# Patient Record
Sex: FEMALE | Race: WHITE | Employment: FULL TIME | URBAN - METROPOLITAN AREA
[De-identification: names, ages, dates, MRNs, and addresses within clinical notes are randomized per-mention and may not be internally consistent; named-entity substitution may affect disease eponyms.]

---

## 2017-04-04 DIAGNOSIS — F41.1 GAD (GENERALIZED ANXIETY DISORDER): ICD-10-CM

## 2017-04-04 NOTE — TELEPHONE ENCOUNTER
sertraline (ZOLOFT) 100 MG tablet     Last Written Prescription Date: 12/14/16  Last Fill Quantity: 180, # refills: 0  Last Office Visit with [facility] primary care provider: 11/16/2015       Last PHQ-9 score on record=   PHQ-9 SCORE 11/16/2015   Total Score 0         MJ Austin  April 4, 2017  10:55 AM

## 2017-04-12 RX ORDER — SERTRALINE HYDROCHLORIDE 100 MG/1
TABLET, FILM COATED ORAL
Qty: 180 TABLET | Refills: 0
Start: 2017-04-12

## 2017-04-14 ENCOUNTER — OFFICE VISIT (OUTPATIENT)
Dept: FAMILY MEDICINE | Facility: CLINIC | Age: 52
End: 2017-04-14

## 2017-04-14 VITALS
BODY MASS INDEX: 41.95 KG/M2 | RESPIRATION RATE: 20 BRPM | SYSTOLIC BLOOD PRESSURE: 120 MMHG | TEMPERATURE: 98.7 F | DIASTOLIC BLOOD PRESSURE: 78 MMHG | WEIGHT: 261 LBS | OXYGEN SATURATION: 97 % | HEIGHT: 66 IN | HEART RATE: 79 BPM

## 2017-04-14 DIAGNOSIS — F41.1 GAD (GENERALIZED ANXIETY DISORDER): ICD-10-CM

## 2017-04-14 PROCEDURE — 99213 OFFICE O/P EST LOW 20 MIN: CPT | Performed by: PHYSICIAN ASSISTANT

## 2017-04-14 RX ORDER — SERTRALINE HYDROCHLORIDE 100 MG/1
200 TABLET, FILM COATED ORAL AT BEDTIME
Qty: 180 TABLET | Refills: 1 | Status: SHIPPED | OUTPATIENT
Start: 2017-04-14 | End: 2017-10-20

## 2017-04-14 ASSESSMENT — ANXIETY QUESTIONNAIRES
3. WORRYING TOO MUCH ABOUT DIFFERENT THINGS: SEVERAL DAYS
7. FEELING AFRAID AS IF SOMETHING AWFUL MIGHT HAPPEN: NOT AT ALL
1. FEELING NERVOUS, ANXIOUS, OR ON EDGE: SEVERAL DAYS
GAD7 TOTAL SCORE: 7
6. BECOMING EASILY ANNOYED OR IRRITABLE: NEARLY EVERY DAY
IF YOU CHECKED OFF ANY PROBLEMS ON THIS QUESTIONNAIRE, HOW DIFFICULT HAVE THESE PROBLEMS MADE IT FOR YOU TO DO YOUR WORK, TAKE CARE OF THINGS AT HOME, OR GET ALONG WITH OTHER PEOPLE: SOMEWHAT DIFFICULT
2. NOT BEING ABLE TO STOP OR CONTROL WORRYING: SEVERAL DAYS
5. BEING SO RESTLESS THAT IT IS HARD TO SIT STILL: NOT AT ALL

## 2017-04-14 ASSESSMENT — PATIENT HEALTH QUESTIONNAIRE - PHQ9: 5. POOR APPETITE OR OVEREATING: SEVERAL DAYS

## 2017-04-14 NOTE — PROGRESS NOTES
SUBJECTIVE:                                                    Ladi Chirinos is a 51 year old female who presents to clinic today for the following health issues:  Anxiety Follow-Up    Status since last visit: No change    Other associated symptoms: stress, tightness in chest    Complicating factors:   Significant life event: No   Current substance abuse: None  Depression symptoms: Yes-  Financial stress  JOSEMANUEL-7 SCORE 11/16/2015   Total Score 3        Ladi is here today for an anxiety follow up. Having some issues with insurance, just wants a med check today. Will have insurance starting May 1st and will come in for a physical following this. 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 Is currently on 200 mg of Zoloft at nighttime. Unsure if any other medications would help as it seems situational. Unsure if she has time for therapy. Does think she has some depression aspects, but thinks this could related to stress.     GAD7: 7       Amount of exercise or physical activity: None    Problems taking medications regularly: No    Medication side effects: none    Diet: regular (no restrictions)    Problem list and histories reviewed & adjusted, as indicated.  Additional history: as documented    BP Readings from Last 3 Encounters:   04/14/17 120/78   11/16/15 92/66   02/20/14 125/78    Wt Readings from Last 3 Encounters:   04/14/17 118.4 kg (261 lb)   11/16/15 103.7 kg (228 lb 11.2 oz)   02/20/14 108.9 kg (240 lb)            Labs reviewed in EPIC    Reviewed and updated as needed this visit by clinical staff  Tobacco  Allergies  Meds  Problems  Med Hx  Surg Hx  Fam Hx  Soc Hx        Reviewed and updated as needed this visit by Provider         ROS:  CONSTITUTIONAL:NEGATIVE for fever, chills, change in weight  INTEGUMENTARY/SKIN: NEGATIVE for worrisome rashes, moles or lesions  MUSCULOSKELETAL: NEGATIVE for significant arthralgias or  "myalgia  PSYCHIATRIC: POSITIVE for anxiety    This document serves as a record of the services and decisions personally performed and made by Emerson Etienne PA-C. It was created on her behalf by Marta Landaverde, a trained medical scribe. The creation of this document is based the provider's statements to the medical scribe.  Marta Landaverde April 14, 2017 3:51 PM    OBJECTIVE:                                                    /78 (BP Location: Right arm, Patient Position: Chair, Cuff Size: Adult Large)  Pulse 79  Temp 98.7  F (37.1  C) (Oral)  Resp 20  Ht 1.676 m (5' 6\")  Wt 118.4 kg (261 lb)  SpO2 97%  BMI 42.13 kg/m2  Body mass index is 42.13 kg/(m^2).  GENERAL: healthy, alert and no distress  MS: no gross musculoskeletal defects noted, no edema  SKIN: no suspicious lesions or rashes  NEURO: Normal strength and tone, mentation intact and speech normal  PSYCH: mentation appears normal, affect normal/bright    Diagnostic Test Results:  none      ASSESSMENT/PLAN:                                                    1. JOSEMANUEL (generalized anxiety disorder)  Zoloft refilled. 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 Discussed we are at top dose of Zoloft. Discussed options of add on of Buspar for anxiety, or Wellbutrin if some depression symptoms. Patient will consider and follow up at physical in May.   - sertraline (ZOLOFT) 100 MG tablet; Take 2 tablets (200 mg) by mouth At Bedtime  Dispense: 180 tablet; Refill: 1      The information in this document, created by the medical scribe for me, accurately reflects the services I personally performed and the decisions made by me. I have reviewed and approved this document for accuracy prior to leaving the patient care area.  3:51 PM 4/14/2017          Emerson Etienne PA-C  [facility]    "

## 2017-04-14 NOTE — MR AVS SNAPSHOT
"              After Visit Summary   4/14/2017    Ladi Chirinos    MRN: [id number]           Patient Information     Date Of Birth          1965        Visit Information        Provider Department      4/14/2017 3:20 PM Emerson Etienne PA-C [facility]        Today's Diagnoses     JOSEMANUEL (generalized anxiety disorder)          Care Instructions    Look into Buspar as an optional add on.         Follow-ups after your visit        Follow-up notes from your care team     Return in about 1 month (around 5/14/2017) for Physical Exam.      Who to contact     If you have questions or need follow up information about today's clinic visit or your schedule please contact [facility] directly at [phone].  Normal or non-critical lab and imaging results will be communicated to you by MyChart, letter or phone within 4 business days after the clinic has received the results. If you do not hear from us within 7 days, please contact the clinic through [employer] or phone. If you have a critical or abnormal lab result, we will notify you by phone as soon as possible.  Submit refill requests through [email or web address] or call your pharmacy and they will forward the refill request to us. Please allow 3 business days for your refill to be completed.          Additional Information About Your Visit        MyChart Information     [email or web address] lets you send messages to your doctor, view your test results, renew your prescriptions, schedule appointments and more. To sign up, go to [email or web address] . Click on \"Log in\" on the left side of the screen, which will take you to the Welcome page. Then click on \"Sign up Now\" on the right side of the page.     You will be asked to enter the access code listed below, as well as some personal information. Please follow the directions to create your username and password.     Your access code is: [email or web address]  Expires: 7/13/2017  4:03 PM     Your access code will " " in 90 days. If you need help or a new code, please call your [place] clinic or [phone].        Care EveryWhere ID     This is your Care EveryWhere ID. This could be used by other organizations to access your [place] medical records  [id number]        Your Vitals Were     Pulse Temperature Respirations Height Pulse Oximetry BMI (Body Mass Index)    79 98.7  F (37.1  C) (Oral) 20 5' 6\" (1.676 m) 97% 42.13 kg/m2       Blood Pressure from Last 3 Encounters:   17 120/78   11/16/15 92/66   14 125/78    Weight from Last 3 Encounters:   17 261 lb (118.4 kg)   11/16/15 228 lb 11.2 oz (103.7 kg)   14 240 lb (108.9 kg)              Today, you had the following     No orders found for display         Where to get your medicines      These medications were sent to [facility]/pharmacy #[id number] - [place], [place] - [address]  [address]    [facility] [address]     Phone:  [phone]     sertraline 100 MG tablet          Primary Care Provider    None Specified       No primary provider on file.        Thank you!     Thank you for choosing [facility]  for your care. Our goal is always to provide you with excellent care. Hearing back from our patients is one way we can continue to improve our services. Please take a few minutes to complete the written survey that you may receive in the mail after your visit with us. Thank you!             Your Updated Medication List - Protect others around you: Learn how to safely use, store and throw away your medicines at www.disposemymeds.org.          This list is accurate as of: 17  4:03 PM.  Always use your most recent med list.                   Brand Name Dispense Instructions for use    LIPID LOWERING THERAPY NOT PRESCRIBED (INTENTIONAL)     0 each    Lipid lowering therapy not prescribed intentionally due to Other - patient does not want to take now. (This option does not exclude patient from measure)       sertraline 100 " MG tablet    ZOLOFT    180 tablet    Take 2 tablets (200 mg) by mouth At Bedtime

## 2017-04-15 ASSESSMENT — ANXIETY QUESTIONNAIRES: GAD7 TOTAL SCORE: 7

## 2017-04-15 ASSESSMENT — PATIENT HEALTH QUESTIONNAIRE - PHQ9: SUM OF ALL RESPONSES TO PHQ QUESTIONS 1-9: 8

## 2017-10-18 ENCOUNTER — TELEPHONE (OUTPATIENT)
Dept: FAMILY MEDICINE | Facility: CLINIC | Age: 52
End: 2017-10-18

## 2017-10-20 DIAGNOSIS — F41.1 GAD (GENERALIZED ANXIETY DISORDER): ICD-10-CM

## 2017-10-20 RX ORDER — SERTRALINE HYDROCHLORIDE 100 MG/1
TABLET, FILM COATED ORAL
Qty: 60 TABLET | Refills: 0 | Status: SHIPPED | OUTPATIENT
Start: 2017-10-20 | End: 2017-10-31

## 2017-10-20 NOTE — TELEPHONE ENCOUNTER
Last Office Visit with [facility], [facility] or [facility] prescribing provider: 4/14/2017

## 2017-10-20 NOTE — LETTER
[facility]  October 20, 2017      [place] [address]           Phone :  [phone]          Fax:  [phone]  Ladi Chirinos  [address] [place] [address]      Dear Ladi,    We recently received a call from your pharmacy requesting a refill of your medication - ZOLOFT.    A review of your chart indicates that an appointment is required with your provider for medication check up. Please call the clinic at [phone] to schedule your appointment.    We have authorized one refill of your medication to allow time for you to schedule your appointment.    Taking care of your health is important to us, and ongoing visits with your provider are vital to your care.  We look forward to seeing you in the near future.        Sincerely,        Emerson Etienne PA-C / Kia Benites RN

## 2017-10-20 NOTE — TELEPHONE ENCOUNTER
Patient due for follow up.  Tried calling patient.  Unable to leave message.  Memory full.  Will give 1 refill and send letter.  Kia Benites RN

## 2017-10-31 ENCOUNTER — OFFICE VISIT (OUTPATIENT)
Dept: FAMILY MEDICINE | Facility: CLINIC | Age: 52
End: 2017-10-31
Payer: COMMERCIAL

## 2017-10-31 VITALS
WEIGHT: 256 LBS | BODY MASS INDEX: 41.14 KG/M2 | HEART RATE: 76 BPM | DIASTOLIC BLOOD PRESSURE: 72 MMHG | SYSTOLIC BLOOD PRESSURE: 124 MMHG | RESPIRATION RATE: 16 BRPM | HEIGHT: 66 IN | TEMPERATURE: 99.4 F

## 2017-10-31 DIAGNOSIS — F41.1 GAD (GENERALIZED ANXIETY DISORDER): ICD-10-CM

## 2017-10-31 PROCEDURE — 99213 OFFICE O/P EST LOW 20 MIN: CPT | Performed by: PHYSICIAN ASSISTANT

## 2017-10-31 RX ORDER — SERTRALINE HYDROCHLORIDE 100 MG/1
200 TABLET, FILM COATED ORAL DAILY
Qty: 180 TABLET | Refills: 1 | Status: SHIPPED | OUTPATIENT
Start: 2017-10-31 | End: 2018-05-29

## 2017-10-31 RX ORDER — BUSPIRONE HYDROCHLORIDE 5 MG/1
TABLET ORAL
Qty: 150 TABLET | Refills: 0 | Status: SHIPPED | OUTPATIENT
Start: 2017-10-31

## 2017-10-31 ASSESSMENT — PATIENT HEALTH QUESTIONNAIRE - PHQ9
5. POOR APPETITE OR OVEREATING: SEVERAL DAYS
SUM OF ALL RESPONSES TO PHQ QUESTIONS 1-9: 4

## 2017-10-31 ASSESSMENT — ANXIETY QUESTIONNAIRES
2. NOT BEING ABLE TO STOP OR CONTROL WORRYING: NOT AT ALL
GAD7 TOTAL SCORE: 5
3. WORRYING TOO MUCH ABOUT DIFFERENT THINGS: NOT AT ALL
1. FEELING NERVOUS, ANXIOUS, OR ON EDGE: SEVERAL DAYS
5. BEING SO RESTLESS THAT IT IS HARD TO SIT STILL: NOT AT ALL
6. BECOMING EASILY ANNOYED OR IRRITABLE: NEARLY EVERY DAY
7. FEELING AFRAID AS IF SOMETHING AWFUL MIGHT HAPPEN: NOT AT ALL

## 2017-10-31 ASSESSMENT — ENCOUNTER SYMPTOMS: NERVOUS/ANXIOUS: 1

## 2017-10-31 NOTE — MR AVS SNAPSHOT
"              After Visit Summary   10/31/2017    Ladi Chirinos    MRN: [id number]           Patient Information     Date Of Birth          1965        Visit Information        Provider Department      10/31/2017 11:00 AM Emerson Etienne PA-C [facility]        Today's Diagnoses     JOSEMANUEL (generalized anxiety disorder)           Follow-ups after your visit        Follow-up notes from your care team     Return in about 1 month (around 2017) for Physical Exam.      Who to contact     If you have questions or need follow up information about today's clinic visit or your schedule please contact [facility] directly at [phone].  Normal or non-critical lab and imaging results will be communicated to you by MyChart, letter or phone within 4 business days after the clinic has received the results. If you do not hear from us within 7 days, please contact the clinic through MyChart or phone. If you have a critical or abnormal lab result, we will notify you by phone as soon as possible.  Submit refill requests through [employer] or call your pharmacy and they will forward the refill request to us. Please allow 3 business days for your refill to be completed.          Additional Information About Your Visit        MyChart Information     [employer] lets you send messages to your doctor, view your test results, renew your prescriptions, schedule appointments and more. To sign up, go to [email or web address] . Click on \"Log in\" on the left side of the screen, which will take you to the Welcome page. Then click on \"Sign up Now\" on the right side of the page.     You will be asked to enter the access code listed below, as well as some personal information. Please follow the directions to create your username and password.     Your access code is: [email or web address]  Expires: 2018 11:36 AM     Your access code will  in 90 days. If you need help or a new code, please call your " "[facility] or [phone].        Care EveryWhere ID     This is your Care EveryWhere ID. This could be used by other organizations to access your [place] medical records  [id number]        Your Vitals Were     Pulse Temperature Respirations Height BMI (Body Mass Index)       76 99.4  F (37.4  C) (Oral) 16 5' 6.25\" (1.683 m) 41.01 kg/m2        Blood Pressure from Last 3 Encounters:   10/31/17 124/72   04/14/17 120/78   11/16/15 92/66    Weight from Last 3 Encounters:   10/31/17 256 lb (116.1 kg)   04/14/17 261 lb (118.4 kg)   11/16/15 228 lb 11.2 oz (103.7 kg)              Today, you had the following     No orders found for display         Today's Medication Changes          These changes are accurate as of: 10/31/17 11:36 AM.  If you have any questions, ask your nurse or doctor.               Start taking these medicines.        Dose/Directions    busPIRone 5 MG tablet   Commonly known as:  BUSPAR   Used for:  JOSEMANUEL (generalized anxiety disorder)   Started by:  Emerson Etienne PA-C        Start at 5 mg twice daily for 3 days, then 7.5 mg (1.5 tabs) twice daily for 3 days, then 10 mg (2 tabs) twice daily for 3 days, then 12.5 mg (2.5 tabs) twice daily for 3 days, then 15 mg (3 tabs) twice daily and stay at that dose   Quantity:  150 tablet   Refills:  0         These medicines have changed or have updated prescriptions.        Dose/Directions    sertraline 100 MG tablet   Commonly known as:  ZOLOFT   This may have changed:  See the new instructions.   Used for:  JOSEMANUEL (generalized anxiety disorder)   Changed by:  Emerson Etienne PA-C        Dose:  200 mg   Take 2 tablets (200 mg) by mouth daily   Quantity:  180 tablet   Refills:  1            Where to get your medicines      These medications were sent to [facility]/pharmacy #[id number] - [place], [place] - [address]  [address], [facility] [address]     Phone:  [phone]     sertraline 100 MG tablet         Some of these will need a paper " prescription and others can be bought over the counter.  Ask your nurse if you have questions.     Bring a paper prescription for each of these medications     busPIRone 5 MG tablet                Primary Care Provider Office Phone # Fax Rory Etienne PA-C [phone]  [facility] [address]        Equal Access to Services     KONRAD CHAUDHARY : Hadii aad ku hadasho Soomaali, waaxda luqadaha, qaybta kaalmada adeegyada, waxay idiin hayaan adeeg kharash la'shaquille . So [facility] [phone].    ATENCIÓN: Si habla español, tiene a warner disposición servicios gratuitos de asistencia lingüística. Llame al [phone].    We comply with applicable federal civil rights laws and [place] laws. We do not discriminate on the basis of race, color, national origin, age, disability, sex, sexual orientation, or gender identity.            Thank you!     Thank you for choosing [facility]  for your care. Our goal is always to provide you with excellent care. Hearing back from our patients is one way we can continue to improve our services. Please take a few minutes to complete the written survey that you may receive in the mail after your visit with us. Thank you!             Your Updated Medication List - Protect others around you: Learn how to safely use, store and throw away your medicines at www.disposemymeds.org.          This list is accurate as of: 10/31/17 11:36 AM.  Always use your most recent med list.                   Brand Name Dispense Instructions for use Diagnosis    busPIRone 5 MG tablet    BUSPAR    150 tablet    Start at 5 mg twice daily for 3 days, then 7.5 mg (1.5 tabs) twice daily for 3 days, then 10 mg (2 tabs) twice daily for 3 days, then 12.5 mg (2.5 tabs) twice daily for 3 days, then 15 mg (3 tabs) twice daily and stay at that dose    JOSEMANUEL (generalized anxiety disorder)       LIPID LOWERING THERAPY NOT PRESCRIBED (INTENTIONAL)     0 each    Lipid  lowering therapy not prescribed intentionally due to Other - patient does not want to take now. (This option does not exclude patient from measure)    Pure hypercholesterolemia       sertraline 100 MG tablet    ZOLOFT    180 tablet    Take 2 tablets (200 mg) by mouth daily    JOSEMANUEL (generalized anxiety disorder)

## 2017-10-31 NOTE — PROGRESS NOTES
"  SUBJECTIVE:                                                    Ladi Chirinos is a 52 year old female who presents to clinic today for the following health issues:      Anxiety     History of Present Illness     Depression & Anxiety Follow-up:     Depression/Anxiety:  Depression & Anxiety    Status since last visit::  Stable    Other associated symptoms of depression and anxiety::  YES    Significant life event::  No    Current substance use::  None      Ladi is here to follow up on zoloft and anxiety.  She feels that things are not completely controlled at this point.  She cares for her mother who has Alzheimer's which is stressful for her.  She also has a 16 year old adopted son from [facility] who is a handful for her.  Usually just teenage things but still a lot to deal with for her as a new single parent.    Problem list and histories reviewed & adjusted, as indicated.  Additional history: as documented    Labs reviewed in EPIC    ROS:  Constitutional, HEENT, cardiovascular, pulmonary, gi and gu systems are negative, except as otherwise noted.      OBJECTIVE:   /72 (BP Location: Right arm, Patient Position: Sitting, Cuff Size: Adult Large)  Pulse 76  Temp 99.4  F (37.4  C) (Oral)  Resp 16  Ht 5' 6.25\" (1.683 m)  Wt 256 lb (116.1 kg)  BMI 41.01 kg/m2  Body mass index is 41.01 kg/(m^2).  GENERAL: healthy, alert and no distress  MS: no gross musculoskeletal defects noted, no edema  SKIN: no suspicious lesions or rashes  PSYCH: mentation appears normal, affect normal/bright    Diagnostic Test Results:  none     ASSESSMENT/PLAN:   1. JOSEMANUEL (generalized anxiety disorder)  Not controlled. Will add Buspar for hopefully additional anxiety control, follow up one month, sooner if needed.  She is in need of PAP, mammo, labs and colon cancer screening.  She should set up her follow up as a physical exam to complete all of these.    PHQ-9 SCORE 11/16/2015 4/14/2017 10/31/2017   Total Score 0 8 4     JOSEMANUEL-7 SCORE " 11/16/2015 4/14/2017 10/31/2017   Total Score 3 7 5     - sertraline (ZOLOFT) 100 MG tablet; Take 2 tablets (200 mg) by mouth daily  Dispense: 180 tablet; Refill: 1  - busPIRone (BUSPAR) 5 MG tablet; Start at 5 mg twice daily for 3 days, then 7.5 mg (1.5 tabs) twice daily for 3 days, then 10 mg (2 tabs) twice daily for 3 days, then 12.5 mg (2.5 tabs) twice daily for 3 days, then 15 mg (3 tabs) twice daily and stay at that dose  Dispense: 150 tablet; Refill: 0      Emerson Etienne PA-C  [facility]

## 2017-10-31 NOTE — NURSING NOTE
"Chief Complaint   Patient presents with     Recheck Medication     Anxiety       Initial /72 (BP Location: Right arm, Patient Position: Sitting, Cuff Size: Adult Large)  Pulse 76  Temp 99.4  F (37.4  C) (Oral)  Resp 16  Ht 5' 6.25\" (1.683 m)  Wt 256 lb (116.1 kg)  BMI 41.01 kg/m2 Estimated body mass index is 41.01 kg/(m^2) as calculated from the following:    Height as of this encounter: 5' 6.25\" (1.683 m).    Weight as of this encounter: 256 lb (116.1 kg).  Medication Reconciliation: complete   Saranya Haider MA      "

## 2017-11-01 ASSESSMENT — ANXIETY QUESTIONNAIRES: GAD7 TOTAL SCORE: 5

## 2017-11-11 ENCOUNTER — HEALTH MAINTENANCE LETTER (OUTPATIENT)
Age: 52
End: 2017-11-11

## 2017-12-08 ENCOUNTER — TELEPHONE (OUTPATIENT)
Dept: FAMILY MEDICINE | Facility: CLINIC | Age: 52
End: 2017-12-08

## 2017-12-08 DIAGNOSIS — Z12.11 SPECIAL SCREENING FOR MALIGNANT NEOPLASMS, COLON: ICD-10-CM

## 2017-12-08 DIAGNOSIS — F41.9 ANXIETY: Primary | ICD-10-CM

## 2017-12-08 RX ORDER — HYDROXYZINE HYDROCHLORIDE 25 MG/1
25-50 TABLET, FILM COATED ORAL EVERY 6 HOURS PRN
Qty: 60 TABLET | Refills: 1 | Status: SHIPPED | OUTPATIENT
Start: 2017-12-08

## 2017-12-15 NOTE — TELEPHONE ENCOUNTER
Patient here for a visit with her mother.  Has not started Buspar.  Is requesting to try Atarax instead.  Will send in rx to try.    leticia

## 2018-02-27 ENCOUNTER — TELEPHONE (OUTPATIENT)
Dept: FAMILY MEDICINE | Facility: CLINIC | Age: 53
End: 2018-02-27

## 2018-02-27 NOTE — LETTER
[address], [address]  [facility] [address]  [phone]  February 27, 2018    Ladi Chirinos  [address]  [facility] [address]      Dear Ladi,    I care about your health and have reviewed your health plan.  I have reviewed your medical conditions, medication list, and lab results and am making recommendations  based on this review, to better manage your health.    You are particularly in need of attention regarding:  -Cholesterol, -Breast Cancer Screening, -Colon Cancer Screening, -Cervical Cancer Screening and -Wellness (Physical) Visit    I am recommending that you:  -schedule a WELLNESS (Physical) APPOINTMENT with me.   I will check fasting labs the same day - nothing to eat except water and meds for 8-10 hours prior.  -schedule a MAMMOGRAM which is due. We have Mammogram available at the [place] location Tuesday AM, Wednesday PM, Thursday all day, and every other Saturday, to schedule call [phone]. Please disregard this reminder if you have had this exam elsewhere within the last year.  It would be helpful for us to have a copy of your mammogram report in our file so that we can best coordinate your care.  -schedule a COLONOSCOPY to look for colon cancer (due every 10 years or 5 years in higher risk situations.)   Colon cancer is now the second leading cause of death in the [place] for both men and women and there are over 130,000 new cases and 50,000 deaths per year from colon cancer.  Colonoscopies can prevent 90-95% of these deaths.  Problem lesions can be removed before they ever become cancer.  This test is not only looking for cancer, but also getting rid of precancerious lesions.  If you do not wish to do a colonoscopy or cannot afford to do one, at this time, there is another option. It is called a FIT test or Fecal Immunochemical Occult Blood Test (take home stool sample kit).  It does not replace the colonoscopy for colorectal  cancer screening, but it can detect hidden bleeding in the lower colon.  It does need to be repeated every year and if a positive result is obtained, you would be referred for a colonoscopy.  If you have completed either one of these tests at another facility, please have the records sent to our clinic so that we can best coordinate your care.  -schedule a PAP SMEAR EXAM which is due.  Please disregard this reminder if you have had this exam elsewhere within the last year.  It would be helpful for us to have a copy of your recent pap smear report in our file so that we can best coordinate your care.      Here is a list of Health Maintenance topics that are due now or due soon:  Health Maintenance Due   Topic Date Due     LIPID SCREEN Q5 YR FEMALE (SYSTEM ASSIGNED)  09/29/2012     PAP Q3 YR  01/01/2016     MAMMO SCREEN Q2 YR (SYSTEM ASSIGNED)  09/29/2017     COLON CANCER SCREEN (SYSTEM ASSIGNED)  09/29/2017       Please call us at [phone] (or use [employer]) to address the above   recommendations.    Thank you for trusting [facility] and we appreciate the opportunity to serve you.  We look forward to supporting your healthcare needs in the future.    Healthy Regards,    Emerson Etienne PAC/krs

## 2018-02-27 NOTE — TELEPHONE ENCOUNTER
Panel Management Review      Patient has the following on her problem list: None      Composite cancer screening  Chart review shows that this patient is due/due soon for the following Pap Smear, Mammogram, Colonoscopy and Fecal Colorectal (FIT)  Summary:    Patient is due/failing the following:   COLONOSCOPY, FIT, MAMMOGRAM, PAP and PHYSICAL    Action needed:   Patient needs office visit for physical with pap. Patient needs referral/order: mammogram, colonoscopy or FIT testing, and fasting labs.    Type of outreach:    Sent letter. - per patient preference    Questions for provider review:    None                                                                                                                                    Saranya Haider MA       Encounter closed.

## 2018-07-10 LAB — PAP SMEAR - HIM PATIENT REPORTED: NEGATIVE

## 2018-07-12 ENCOUNTER — TRANSFERRED RECORDS (OUTPATIENT)
Dept: HEALTH INFORMATION MANAGEMENT | Facility: CLINIC | Age: 53
End: 2018-07-12

## 2018-07-25 ENCOUNTER — TELEPHONE (OUTPATIENT)
Dept: FAMILY MEDICINE | Facility: CLINIC | Age: 53
End: 2018-07-25

## 2018-07-25 NOTE — TELEPHONE ENCOUNTER
Panel Management Review      Patient has the following on her problem list: None      Composite cancer screening  Chart review shows that this patient is due/due soon for the following Pap Smear, Mammogram, Colonoscopy and Fecal Colorectal (FIT)  Summary:    Patient is due/failing the following:   COLONOSCOPY, LDL, MAMMOGRAM, PAP and PHYSICAL    Action needed:   Patient needs office visit for physical with pap, fasting labs, mammogram, colonoscopy or FIT testing.    Type of outreach:    Phone, left message for patient to call back.     Questions for provider review:    None                                                                                                                                    Saranya Haider MA       Chart routed to Care Team .

## 2018-08-01 NOTE — TELEPHONE ENCOUNTER
Spoke with patient no longer able to come to . Pap completed 7/10/18, mammogram 7/12/18 all normal at the [facility], located within [facility].     Saranya Haider MA    Encounter closed.     Please abstract the following data from this visit with this patient into the appropriate field in Epic:    Mammogram done on this date: 7/12/18 (approximately), by this group: [facility], results were normal.   Pap smear done on this date: 7/10/18 (approximately), by this group: [facility], results were normal.
